# Patient Record
(demographics unavailable — no encounter records)

---

## 2025-04-03 NOTE — ASSESSMENT
[FreeTextEntry1] : IMP: 90yo F w/ hx of L  LCIS 1/2009 and R LCIS/DCIS 2/2009 MMG 12/2024: B2 MRI breast 1/2025: B2 Evista 60 mg daily as chemo-prevention  PLAN: continue on Evista  60 mg daily RTO yearly B/L mammo/sono 12/2025 MRI Q2 years (12/2026)

## 2025-04-03 NOTE — HISTORY OF PRESENT ILLNESS
[de-identified] : Ms. THELMA FINKELSTEIN is a 91 year old woman here for a follow-up visit.   Her history is significant for LCIS involving the left breast for which she underwent an excision in January 2009 with negative margins.  Subsequent right breast biopsy in February 2009 yielded LCIS and intraductal papilloma, prompting a right breast excision, which revealed a focus of DCIS (ER+/[place]+).    Patient is currently on Evista 60 mg PO daily with no complaints.   MRI breast 12/28/2022: No evidence of malignancy. BIRADS 2   B/L mammo (no sono) 12/2023 - BIRADS 2   B/L SM 12/27/2024: B2 MRI breast 1/28/2025: B2  At this time patient denies breast pain, palpable masses, skin changes and/or nipple discharge.   Internist: Dr. Santiago Archuleta

## 2025-04-03 NOTE — PHYSICAL EXAM
[Normal] : supple, no neck mass and thyroid not enlarged [Normal Supraclavicular Lymph Nodes] : normal supraclavicular lymph nodes [Normal Axillary Lymph Nodes] : normal axillary lymph nodes [Normal] : oriented to person, place and time, with appropriate affect [de-identified] : no masses or discharge

## 2025-04-03 NOTE — HISTORY OF PRESENT ILLNESS
[de-identified] : Ms. THELMA FINKELSTEIN is a 91 year old woman here for a follow-up visit.   Her history is significant for LCIS involving the left breast for which she underwent an excision in January 2009 with negative margins.  Subsequent right breast biopsy in February 2009 yielded LCIS and intraductal papilloma, prompting a right breast excision, which revealed a focus of DCIS (ER+/[place]+).    Patient is currently on Evista 60 mg PO daily with no complaints.   MRI breast 12/28/2022: No evidence of malignancy. BIRADS 2   B/L mammo (no sono) 12/2023 - BIRADS 2   B/L SM 12/27/2024: B2 MRI breast 1/28/2025: B2  At this time patient denies breast pain, palpable masses, skin changes and/or nipple discharge.   Internist: Dr. Santiago Archuleta

## 2025-04-03 NOTE — PHYSICAL EXAM
[Normal] : supple, no neck mass and thyroid not enlarged [Normal Supraclavicular Lymph Nodes] : normal supraclavicular lymph nodes [Normal Axillary Lymph Nodes] : normal axillary lymph nodes [Normal] : oriented to person, place and time, with appropriate affect [de-identified] : no masses or discharge

## 2025-04-03 NOTE — CONSULT LETTER
[Dear  ___] : Dear  [unfilled], [Courtesy Letter:] : I had the pleasure of seeing your patient, [unfilled], in my office today. [Please see my note below.] : Please see my note below. [Consult Closing:] : Thank you very much for allowing me to participate in the care of this patient.  If you have any questions, please do not hesitate to contact me. [Sincerely,] : Sincerely, [FreeTextEntry1] : I will keep you informed of my follow-up. [FreeTextEntry3] : Gilbert Pham MD FACS Chief of Surgical Oncology

## 2025-04-03 NOTE — ASSESSMENT
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

## 2025-07-02 NOTE — REASON FOR VISIT
[Structural Heart and Valve Disease] : structural heart and valve disease [FreeTextEntry3] : Dr. Santiago Archuleta

## 2025-07-02 NOTE — HISTORY OF PRESENT ILLNESS
[FreeTextEntry1] : Ms. Finkelstein is a 91-year-old female, referred by Dr. Santiago Archuleta for evaluation of mitral regurgitation. Dr. Archuleta has retired and has referred her to Dr. Puri for future cardiology care. He wanted her to be evaluated in our mitral clinic given her history of mitral regurgitation. Her past medical history includes breast cancer, HTN, and osteopenia.   She lives alone and is independent in all ADLs; she drives, manages her daily household chores, and grocery shops without difficulty. She has no shortness of breath, dizziness, PND, or orthopnea. She has "poor circulation" and uses a LE compression device for 30 minutes twice per day. She follows with Dr. Oswald for her vascular care. She has had no hospitalizations or ED visits and is generally feeling well.   I have reviewed her outpatient echocardiogram with Dr. Nichols with the following impressions: TTE review from 5/19/2025 shows posterior leaflet prolapse with severe, anteriorly directed mitral regurgitation.  There is posterior mitral annular calcification.  The mean gradient = 2.5 mmHg at a heart rate of 69 bpm. There is moderate tricuspid regurgitation.  There is mild-moderate pulmonary hypertension with an RVSP = 45 mmHg. Normal biventricular size and function. She would need a CLARISA to confirm, but her anatomy is likely amenable for mTEER.  She admits to fatigue, which she attributed to age, and ERWIN with walking 1 block. She carries in 4 bags of groceries once a week and goes up 7 steps to her kitchen and "I get a little breathless, it tires me" for the past year, but not more than that. She notes "I hadn't had an echocardiogram for 3 years" but Dr Archuleta encouraged her to repeat one in the setting of his impending intermediate. She notes she is fatgiued and napping more often than prior; "I spend much more time in bed now" which she attributes to "my circulation".

## 2025-07-02 NOTE — CARDIOLOGY SUMMARY
[de-identified] : None today; prior NSR [de-identified] : 5/19/25 LVEF 62%, moderate/severe MR, moderate TR

## 2025-07-02 NOTE — DISCUSSION/SUMMARY
[FreeTextEntry1] : FINKELSTEIN: Symptomatic severe primary/degenerative MR. Schedule CLARISA. She is on the fence regarding mTEER, but amenable to a CLARISA to see what her options are. Her goal is to maintain her independence, QOL, and functional status, which I think mTEER can potentially offer her. If we ultimately are to proceed, she would need to see a surgeon.

## 2025-07-02 NOTE — PHYSICAL EXAM
[Well Developed] : well developed [Well Nourished] : well nourished [Normal Rate] : normal [Rhythm Regular] : regular [III] : a grade 3 [No Pitting Edema] : no pitting edema present [Clear Lung Fields] : clear lung fields [Soft] : abdomen soft [Non Tender] : non-tender [Normal Gait] : normal gait [Venous stasis] : venous stasis [Normal] : alert and oriented, normal memory [Rt] : no varicose veins of the right leg [Lt] : no varicose veins of the left leg [de-identified] : no pitting edema

## 2025-07-02 NOTE — REVIEW OF SYSTEMS
[Change In Color Of Skin] : change in skin color [Negative] : Heme/Lymph [Dyspnea on exertion] : not dyspnea during exertion [Chest Discomfort] : no chest discomfort [Lower Ext Edema] : no extremity edema [Palpitations] : no palpitations [Orthopnea] : no orthopnea [PND] : no PND [Abdominal Pain] : no abdominal pain [Change in Appetite] : no change in appetite [Dizziness] : no dizziness